# Patient Record
Sex: MALE | Race: AMERICAN INDIAN OR ALASKA NATIVE
[De-identification: names, ages, dates, MRNs, and addresses within clinical notes are randomized per-mention and may not be internally consistent; named-entity substitution may affect disease eponyms.]

---

## 2021-12-28 ENCOUNTER — HOSPITAL ENCOUNTER (EMERGENCY)
Dept: HOSPITAL 5 - ED | Age: 26
LOS: 1 days | Discharge: HOME | End: 2021-12-29
Payer: SELF-PAY

## 2021-12-28 VITALS — DIASTOLIC BLOOD PRESSURE: 80 MMHG | SYSTOLIC BLOOD PRESSURE: 138 MMHG

## 2021-12-28 DIAGNOSIS — K29.70: Primary | ICD-10-CM

## 2021-12-28 PROCEDURE — 99281 EMR DPT VST MAYX REQ PHY/QHP: CPT

## 2021-12-28 NOTE — EMERGENCY DEPARTMENT REPORT
Vomiting/Diarrhea





- HPI


Chief Complaint: Abdominal Pain


Stated Complaint: THROWING UP BLOOD


Time Seen by Provider: 12/28/21 18:46


Duration: Today


Severity: moderate


Nausea/Vomiting Severity: Moderate


Diarrhea Severity: None


Pain Location: Epigastric


Pain Severity: Mild


Symptoms: Yes Able to Tolerate Fluids, Yes Recent Unusual Foods (ate at [employer] 

bell before Sx started), No Bloody diarrhea, No Fever


Other History: Patient is a 26-year-old F American male who ate some tacos with 

salsa prior to his vomiting.  States the vomit was bright red in color.  Patient

believes he was throwing up blood.  He already went to the outside urgent care 

and was given a prescription for Zofran but states that his nausea is still 

present.  Patient has not vomited anymore.





ED Review of Systems


ROS: 


Stated complaint: THROWING UP BLOOD


Other details as noted in HPI





Comment: All other systems reviewed and negative





ED Past Medical Hx





- Medications


Home Medications: 


                                Home Medications











 Medication  Instructions  Recorded  Confirmed  Last Taken  Type


 


Dicyclomine [Bentyl] 20 mg PO QID #10 tablet 12/28/21  Unknown Rx


 


Famotidine [Pepcid] 20 mg PO BID #20 tablet 12/28/21  Unknown Rx


 


Metoclopramide [Reglan] 10 mg PO TID PRN #12 tab 12/28/21  Unknown Rx














Vomiting Diarrhea Exam





- Exam


General: 


Vital signs noted. No distress. Alert and acting appropriately.





HEENT: Yes Moist Mucous Membranes, No Pharyngeal Erythema, No Pharyngeal 

Exudates, No Rhinorrhea, No Conjuctival Injection, No Frontal Tenderness, No 

Maxillary Tenderness


Neck: No Adenopathy, No Rigidity


Lungs: Yes Clear Lung Sounds, Yes Good Air Exchange, No Wheezes, No Stridor, No 

Cough, No Nasal Flaring, No Retractions, No Use of Accessory Muscles


Heart exam: Regular: Yes, Murmur: No, Tachycardia: No


Abdomen: Tenderness: Yes (mild epigastric), Peritoneal Signs: No, Distention: 

No, Hyperactive Bowel sounds: No


Skin exam: Rash: No, Edema: No, Normal turgor: Yes


Neurologic: 


Alert and oriented, no deficits.








Musculoskeletal: 


Unremarkable.











ED Course


                                   Vital Signs











  12/28/21





  16:42


 


Temperature 97.6 F


 


Pulse Rate 72


 


Respiratory 14





Rate 


 


Blood Pressure 138/80


 


O2 Sat by Pulse 99





Oximetry 














ED Medical Decision Making





- Medical Decision Making





Patient likely is vomiting just red food coloring from the salsa packets used on

 his food.  Is not endorsing any coffee-ground type emesis.  Patient only with 

mild epigastric discomfort.  Patient states he feels as though the Zofran given 

as a prescription was not helping.  We'll start the patient on Reglan also start

 him on Pepcid and Bentyl the patient be stable for discharge.


Critical care attestation.: 


If time is entered above; I have spent that time in minutes in the direct care 

of this critically ill patient, excluding procedure time.








ED Disposition


Clinical Impression: 


 Acute gastritis





Disposition: 01 HOME / SELF CARE / HOMELESS


Is pt being admited?: No


Does the pt Need Aspirin: No


Condition: Stable


Instructions:  Gastritis, Adult, Easy-to-Read


Time of Disposition: 18:49